# Patient Record
Sex: MALE | Race: WHITE | Employment: OTHER | URBAN - METROPOLITAN AREA
[De-identification: names, ages, dates, MRNs, and addresses within clinical notes are randomized per-mention and may not be internally consistent; named-entity substitution may affect disease eponyms.]

---

## 2022-02-15 RX ORDER — ATORVASTATIN CALCIUM 20 MG/1
20 TABLET, FILM COATED ORAL
COMMUNITY

## 2022-02-15 RX ORDER — AMLODIPINE AND BENAZEPRIL HYDROCHLORIDE 5; 10 MG/1; MG/1
1 CAPSULE ORAL DAILY
COMMUNITY

## 2022-02-15 RX ORDER — FINASTERIDE 5 MG/1
5 TABLET, FILM COATED ORAL DAILY
COMMUNITY

## 2022-02-15 RX ORDER — PANTOPRAZOLE SODIUM 40 MG/1
40 TABLET, DELAYED RELEASE ORAL
COMMUNITY

## 2022-02-15 NOTE — PERIOP NOTES
[address], [address] [facility]   MAIN OR                                  [phone]   MAIN PRE OP                          [phone]                                                                                AMBULATORY PRE OP          [phone]  PRE-ADMISSION TESTING    [phone]   Surgery Date: Wednesday, 2/16/2022       Is surgery arrival time given by surgeon? NO  If NO, [facility] staff will call you between 3 and 7pm the day before your surgery with your arrival time. (If your surgery is on a Monday, we will call you the Friday before.)    Call [phone] after 7pm Monday-Friday if you did not receive this call. INSTRUCTIONS BEFORE YOUR SURGERY   When You  Arrive Arrive at the [address] [facility] on the day of your surgery  Have your insurance card, photo ID, and any copayment (if needed)   Food   and   Drink NO food or drink after midnight the night before surgery    This means NO water, gum, mints, coffee, juice, etc.  No alcohol (beer, wine, liquor) 24 hours before and after surgery   Medications to   TAKE   Morning of Surgery MEDICATIONS TO TAKE THE MORNING OF SURGERY WITH A SIP OF WATER:    Finasteride   Medications  To  STOP      7 days before surgery  Non-Steroidal anti-inflammatory Drugs (NSAID's): for example, Ibuprofen (Advil, Motrin), Naproxen (Aleve)   Aspirin, if taking for pain    Herbal supplements, vitamins, and fish oil   Other:  (Pain medications not listed above, including Tylenol may be taken)   Blood  Thinners  If you take  Aspirin, Plavix, Coumadin, or any blood-thinning or anti-blood clot medicine, talk to the doctor who prescribed the medications for pre-operative instructions.    Bathing Clothing  Jewelry  Valuables      If you shower the morning of surgery, please do not apply anything to your skin (lotions, powders, deodorant, or makeup, especially mascara)   Do not shave or trim anywhere 24 hours before surgery   Wear your hair loose or down; no pony-tails, buns, or metal hair clips   Wear loose, comfortable, clean clothes   Wear glasses instead of contacts  [address], valuables, and jewelry, including body piercings, at home   If you were given an [employer], bring it on day of surgery. Going Home - or Spending the Night  SAME-DAY SURGERY: You must have a responsible adult drive you home and stay with you 24 hours after surgery   ADMITS: If your doctor is keeping you in the hospital after surgery, leave personal belongings/luggage in your car until you have a hospital room number. Hospital discharge time is 12 noon  Drivers must be here before 12 noon unless you are told differently   Special Instructions   What to watch for:   Coronavirus (COVID-19) affects different people in different ways   It also appears with a wide range of symptoms from mild to severe   Signs usually appear 2-14 days after exposure     If you develop any of the following, notify your doctor immediately:  o Fever  o Chills, with or without a shiver  o Muscle pain  o Headache  o Sore throat  o Dry cough  o New loss of taste or smell  o Tiredness      If you develop any of the following, call 911:  o Shortness of breath  o Difficulty breathing  o Chest pain  o New confusion  o Blueness of fingers and/or lips         Follow all instructions so your surgery wont be cancelled. Please, be on time. If a situation occurs and you are delayed the day of surgery, call [phone]. If your physical condition changes (like a fever, cold, flu, etc.) call your surgeon. Home medication(s) reviewed and verified via PHONE during PAT appointment. The patient was contacted by  PHONE    The patient verbalizes understanding of all instructions and DOES NOT   need reinforcement.

## 2022-02-16 ENCOUNTER — ANESTHESIA (OUTPATIENT)
Dept: SURGERY | Age: 87
End: 2022-02-16
Payer: MEDICARE

## 2022-02-16 ENCOUNTER — ANESTHESIA EVENT (OUTPATIENT)
Dept: SURGERY | Age: 87
End: 2022-02-16
Payer: MEDICARE

## 2022-02-16 ENCOUNTER — HOSPITAL ENCOUNTER (OUTPATIENT)
Age: 87
Setting detail: OUTPATIENT SURGERY
Discharge: HOME | End: 2022-02-16
Attending: OTOLARYNGOLOGY | Admitting: OTOLARYNGOLOGY
Payer: MEDICARE

## 2022-02-16 VITALS
TEMPERATURE: 97.9 F | WEIGHT: 132.94 LBS | HEART RATE: 98 BPM | BODY MASS INDEX: 19.69 KG/M2 | SYSTOLIC BLOOD PRESSURE: 123 MMHG | DIASTOLIC BLOOD PRESSURE: 57 MMHG | HEIGHT: 69 IN | OXYGEN SATURATION: 92 % | RESPIRATION RATE: 18 BRPM

## 2022-02-16 DIAGNOSIS — Z98.890 MOHS DEFECT OF LEFT CHEEK: Primary | ICD-10-CM

## 2022-02-16 DIAGNOSIS — M95.2 MOHS DEFECT OF LEFT CHEEK: Primary | ICD-10-CM

## 2022-02-16 LAB
COVID-19 RAPID TEST, COVR: NOT DETECTED
SOURCE, COVRS: NORMAL

## 2022-02-16 PROCEDURE — 77030002974 HC SUT PLN J&J -A: Performed by: OTOLARYNGOLOGY

## 2022-02-16 PROCEDURE — 76010000131 HC OR TIME 2 TO 2.5 HR: Performed by: OTOLARYNGOLOGY

## 2022-02-16 PROCEDURE — 74011000272 HC RX REV CODE- 272: Performed by: OTOLARYNGOLOGY

## 2022-02-16 PROCEDURE — 74011000250 HC RX REV CODE- 250: Performed by: OTOLARYNGOLOGY

## 2022-02-16 PROCEDURE — 74011250636 HC RX REV CODE- 250/636: Performed by: ANESTHESIOLOGY

## 2022-02-16 PROCEDURE — 77030008771 HC TU NG SALEM SUMP -A: Performed by: ANESTHESIOLOGY

## 2022-02-16 PROCEDURE — 77030002916 HC SUT ETHLN J&J -A: Performed by: OTOLARYNGOLOGY

## 2022-02-16 PROCEDURE — 77030013079 HC BLNKT BAIR HGGR 3M -A: Performed by: ANESTHESIOLOGY

## 2022-02-16 PROCEDURE — 77030031139 HC SUT VCRL2 J&J -A: Performed by: OTOLARYNGOLOGY

## 2022-02-16 PROCEDURE — 74011000250 HC RX REV CODE- 250: Performed by: NURSE ANESTHETIST, CERTIFIED REGISTERED

## 2022-02-16 PROCEDURE — 74011250636 HC RX REV CODE- 250/636: Performed by: OTOLARYNGOLOGY

## 2022-02-16 PROCEDURE — 77030040506 HC DRN WND MDII -A: Performed by: OTOLARYNGOLOGY

## 2022-02-16 PROCEDURE — 77030002986 HC SUT PROL J&J -A: Performed by: OTOLARYNGOLOGY

## 2022-02-16 PROCEDURE — 74011250636 HC RX REV CODE- 250/636: Performed by: NURSE ANESTHETIST, CERTIFIED REGISTERED

## 2022-02-16 PROCEDURE — 77030026438 HC STYL ET INTUB CARD -A: Performed by: ANESTHESIOLOGY

## 2022-02-16 PROCEDURE — 2709999900 HC NON-CHARGEABLE SUPPLY: Performed by: OTOLARYNGOLOGY

## 2022-02-16 PROCEDURE — 76060000035 HC ANESTHESIA 2 TO 2.5 HR: Performed by: OTOLARYNGOLOGY

## 2022-02-16 PROCEDURE — 77030008684 HC TU ET CUF COVD -B: Performed by: ANESTHESIOLOGY

## 2022-02-16 PROCEDURE — 76210000016 HC OR PH I REC 1 TO 1.5 HR: Performed by: OTOLARYNGOLOGY

## 2022-02-16 PROCEDURE — 87635 SARS-COV-2 COVID-19 AMP PRB: CPT

## 2022-02-16 PROCEDURE — 77030040356 HC CORD BPLR FRCP COVD -A: Performed by: OTOLARYNGOLOGY

## 2022-02-16 RX ORDER — SODIUM CHLORIDE, SODIUM LACTATE, POTASSIUM CHLORIDE, CALCIUM CHLORIDE 600; 310; 30; 20 MG/100ML; MG/100ML; MG/100ML; MG/100ML
75 INJECTION, SOLUTION INTRAVENOUS CONTINUOUS
Status: DISCONTINUED | OUTPATIENT
Start: 2022-02-16 | End: 2022-02-17 | Stop reason: HOSPADM

## 2022-02-16 RX ORDER — HYDROCODONE BITARTRATE AND ACETAMINOPHEN 5; 325 MG/1; MG/1
1 TABLET ORAL
Qty: 10 TABLET | Refills: 0 | Status: SHIPPED | OUTPATIENT
Start: 2022-02-16 | End: 2022-02-19

## 2022-02-16 RX ORDER — EPHEDRINE SULFATE/0.9% NACL/PF 50 MG/5 ML
SYRINGE (ML) INTRAVENOUS AS NEEDED
Status: DISCONTINUED | OUTPATIENT
Start: 2022-02-16 | End: 2022-02-16 | Stop reason: HOSPADM

## 2022-02-16 RX ORDER — LIDOCAINE HYDROCHLORIDE 20 MG/ML
INJECTION, SOLUTION EPIDURAL; INFILTRATION; INTRACAUDAL; PERINEURAL AS NEEDED
Status: DISCONTINUED | OUTPATIENT
Start: 2022-02-16 | End: 2022-02-16 | Stop reason: HOSPADM

## 2022-02-16 RX ORDER — FAMOTIDINE 10 MG/ML
INJECTION INTRAVENOUS AS NEEDED
Status: DISCONTINUED | OUTPATIENT
Start: 2022-02-16 | End: 2022-02-16 | Stop reason: HOSPADM

## 2022-02-16 RX ORDER — ONDANSETRON 2 MG/ML
4 INJECTION INTRAMUSCULAR; INTRAVENOUS AS NEEDED
Status: DISCONTINUED | OUTPATIENT
Start: 2022-02-16 | End: 2022-02-17 | Stop reason: HOSPADM

## 2022-02-16 RX ORDER — ALBUTEROL SULFATE 0.83 MG/ML
2.5 SOLUTION RESPIRATORY (INHALATION) AS NEEDED
Status: DISCONTINUED | OUTPATIENT
Start: 2022-02-16 | End: 2022-02-17 | Stop reason: HOSPADM

## 2022-02-16 RX ORDER — BACITRACIN ZINC 500 UNIT/G
OINTMENT (GRAM) TOPICAL AS NEEDED
Status: DISCONTINUED | OUTPATIENT
Start: 2022-02-16 | End: 2022-02-16 | Stop reason: HOSPADM

## 2022-02-16 RX ORDER — PROPOFOL 10 MG/ML
INJECTION, EMULSION INTRAVENOUS
Status: DISCONTINUED | OUTPATIENT
Start: 2022-02-16 | End: 2022-02-16 | Stop reason: HOSPADM

## 2022-02-16 RX ORDER — SODIUM CHLORIDE, SODIUM LACTATE, POTASSIUM CHLORIDE, CALCIUM CHLORIDE 600; 310; 30; 20 MG/100ML; MG/100ML; MG/100ML; MG/100ML
125 INJECTION, SOLUTION INTRAVENOUS CONTINUOUS
Status: DISCONTINUED | OUTPATIENT
Start: 2022-02-16 | End: 2022-02-17 | Stop reason: HOSPADM

## 2022-02-16 RX ORDER — LIDOCAINE HYDROCHLORIDE AND EPINEPHRINE 10; 10 MG/ML; UG/ML
INJECTION, SOLUTION INFILTRATION; PERINEURAL AS NEEDED
Status: DISCONTINUED | OUTPATIENT
Start: 2022-02-16 | End: 2022-02-16 | Stop reason: HOSPADM

## 2022-02-16 RX ORDER — CEFDINIR 300 MG/1
600 CAPSULE ORAL DAILY
Qty: 14 CAPSULE | Refills: 0 | Status: SHIPPED | OUTPATIENT
Start: 2022-02-16 | End: 2022-02-23

## 2022-02-16 RX ORDER — DEXAMETHASONE SODIUM PHOSPHATE 4 MG/ML
INJECTION, SOLUTION INTRA-ARTICULAR; INTRALESIONAL; INTRAMUSCULAR; INTRAVENOUS; SOFT TISSUE AS NEEDED
Status: DISCONTINUED | OUTPATIENT
Start: 2022-02-16 | End: 2022-02-16 | Stop reason: HOSPADM

## 2022-02-16 RX ORDER — FENTANYL CITRATE 50 UG/ML
INJECTION, SOLUTION INTRAMUSCULAR; INTRAVENOUS AS NEEDED
Status: DISCONTINUED | OUTPATIENT
Start: 2022-02-16 | End: 2022-02-16 | Stop reason: HOSPADM

## 2022-02-16 RX ORDER — ONDANSETRON 4 MG/1
4 TABLET, ORALLY DISINTEGRATING ORAL
Qty: 20 TABLET | Refills: 2 | Status: SHIPPED | OUTPATIENT
Start: 2022-02-16

## 2022-02-16 RX ORDER — SUCCINYLCHOLINE CHLORIDE 20 MG/ML
INJECTION INTRAMUSCULAR; INTRAVENOUS AS NEEDED
Status: DISCONTINUED | OUTPATIENT
Start: 2022-02-16 | End: 2022-02-16 | Stop reason: HOSPADM

## 2022-02-16 RX ORDER — DEXAMETHASONE SODIUM PHOSPHATE 4 MG/ML
10 INJECTION, SOLUTION INTRA-ARTICULAR; INTRALESIONAL; INTRAMUSCULAR; INTRAVENOUS; SOFT TISSUE
Status: DISCONTINUED | OUTPATIENT
Start: 2022-02-16 | End: 2022-02-16 | Stop reason: HOSPADM

## 2022-02-16 RX ORDER — FENTANYL CITRATE 50 UG/ML
50 INJECTION, SOLUTION INTRAMUSCULAR; INTRAVENOUS
Status: DISCONTINUED | OUTPATIENT
Start: 2022-02-16 | End: 2022-02-17 | Stop reason: HOSPADM

## 2022-02-16 RX ORDER — PHENYLEPHRINE HCL IN 0.9% NACL 0.4MG/10ML
SYRINGE (ML) INTRAVENOUS AS NEEDED
Status: DISCONTINUED | OUTPATIENT
Start: 2022-02-16 | End: 2022-02-16 | Stop reason: HOSPADM

## 2022-02-16 RX ORDER — PROPOFOL 10 MG/ML
INJECTION, EMULSION INTRAVENOUS AS NEEDED
Status: DISCONTINUED | OUTPATIENT
Start: 2022-02-16 | End: 2022-02-16 | Stop reason: HOSPADM

## 2022-02-16 RX ORDER — ROCURONIUM BROMIDE 10 MG/ML
INJECTION, SOLUTION INTRAVENOUS AS NEEDED
Status: DISCONTINUED | OUTPATIENT
Start: 2022-02-16 | End: 2022-02-16 | Stop reason: HOSPADM

## 2022-02-16 RX ORDER — BACITRACIN ZINC 500 UNIT/G
OINTMENT (GRAM) TOPICAL
Qty: 15 G | Refills: 0 | Status: SHIPPED | OUTPATIENT
Start: 2022-02-16

## 2022-02-16 RX ADMIN — CEFAZOLIN SODIUM 2 G: 1 POWDER, FOR SOLUTION INTRAMUSCULAR; INTRAVENOUS at 17:39

## 2022-02-16 RX ADMIN — SODIUM CHLORIDE, POTASSIUM CHLORIDE, SODIUM LACTATE AND CALCIUM CHLORIDE: 600; 310; 30; 20 INJECTION, SOLUTION INTRAVENOUS at 16:55

## 2022-02-16 RX ADMIN — PROPOFOL 50 MG: 10 INJECTION, EMULSION INTRAVENOUS at 18:04

## 2022-02-16 RX ADMIN — FENTANYL CITRATE 50 MCG: 50 INJECTION, SOLUTION INTRAMUSCULAR; INTRAVENOUS at 20:21

## 2022-02-16 RX ADMIN — Medication 80 MCG: at 18:20

## 2022-02-16 RX ADMIN — LIDOCAINE HYDROCHLORIDE 40 MG: 20 INJECTION, SOLUTION EPIDURAL; INFILTRATION; INTRACAUDAL; PERINEURAL at 17:29

## 2022-02-16 RX ADMIN — FENTANYL CITRATE 50 MCG: 50 INJECTION INTRAMUSCULAR; INTRAVENOUS at 17:17

## 2022-02-16 RX ADMIN — FENTANYL CITRATE 25 MCG: 50 INJECTION INTRAMUSCULAR; INTRAVENOUS at 18:09

## 2022-02-16 RX ADMIN — ROCURONIUM BROMIDE 10 MG: 10 INJECTION INTRAVENOUS at 17:29

## 2022-02-16 RX ADMIN — SUCCINYLCHOLINE CHLORIDE 80 MG: 20 INJECTION, SOLUTION INTRAMUSCULAR; INTRAVENOUS at 17:30

## 2022-02-16 RX ADMIN — SODIUM CHLORIDE, POTASSIUM CHLORIDE, SODIUM LACTATE AND CALCIUM CHLORIDE: 600; 310; 30; 20 INJECTION, SOLUTION INTRAVENOUS at 18:05

## 2022-02-16 RX ADMIN — Medication 80 MCG: at 18:28

## 2022-02-16 RX ADMIN — FAMOTIDINE 20 MG: 10 INJECTION INTRAVENOUS at 17:19

## 2022-02-16 RX ADMIN — DEXAMETHASONE SODIUM PHOSPHATE 10 MG: 4 INJECTION, SOLUTION INTRAMUSCULAR; INTRAVENOUS at 17:35

## 2022-02-16 RX ADMIN — Medication 10 MG: at 17:44

## 2022-02-16 RX ADMIN — PROPOFOL 150 MCG/KG/MIN: 10 INJECTION, EMULSION INTRAVENOUS at 17:36

## 2022-02-16 RX ADMIN — Medication 10 MG: at 17:39

## 2022-02-16 RX ADMIN — PROPOFOL 100 MCG/KG/MIN: 10 INJECTION, EMULSION INTRAVENOUS at 17:37

## 2022-02-16 RX ADMIN — ROCURONIUM BROMIDE 10 MG: 10 INJECTION INTRAVENOUS at 17:41

## 2022-02-16 RX ADMIN — FENTANYL CITRATE 50 MCG: 50 INJECTION INTRAMUSCULAR; INTRAVENOUS at 17:27

## 2022-02-16 RX ADMIN — PROPOFOL 100 MG: 10 INJECTION, EMULSION INTRAVENOUS at 17:29

## 2022-02-16 NOTE — ANESTHESIA PREPROCEDURE EVALUATION
Relevant Problems   No relevant active problems       Anesthetic History   No history of anesthetic complications            Review of Systems / Medical History  Patient summary reviewed, nursing notes reviewed and pertinent labs reviewed    Pulmonary          Smoker         Neuro/Psych   Within defined limits           Cardiovascular    Hypertension              Exercise tolerance: >4 METS     GI/Hepatic/Renal     GERD    Renal disease: CRI  PUD     Endo/Other        Cancer     Other Findings   Comments: Skin cancer on face           Physical Exam    Airway  Mallampati: II    Neck ROM: normal range of motion   Mouth opening: Normal     Cardiovascular  Regular rate and rhythm,  S1 and S2 normal,  no murmur, click, rub, or gallop  Rhythm: regular  Rate: normal         Dental    Dentition: Edentulous     Pulmonary  Breath sounds clear to auscultation               Abdominal  GI exam deferred       Other Findings            Anesthetic Plan    ASA: 3  Anesthesia type: general          Induction: Intravenous  Anesthetic plan and risks discussed with: Patient

## 2022-02-16 NOTE — H&P
Otolaryngology, Head and Neck Surgery    Chief Complaint:    History of Present Illness:   Patient is a 80 y.o. male who is being seen for Mohs reconstruction. Underwent excision of a left cheek skin cancer by Dr. Joe Seals last week. He presents for closure. 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 No pertinent family history. Social History     Tobacco Use    Smoking status: Current Every Day Smoker     Packs/day: 1.00     Years: 73.00     Pack years: 73.00    Smokeless tobacco: Former User   Substance Use Topics    Alcohol use: Not Currently     Past Surgical History:   Procedure Laterality Date    HX APPENDECTOMY      HX HEMORRHOIDECTOMY  56    HX HERNIA REPAIR Right 1976   [facility]  [id number]    multiple facial surgeries for CA      Current Facility-Administered Medications   Medication Dose Route Frequency    ceFAZolin (ANCEF) 2 g in sterile water (preservative free) 20 mL IV syringe  2 g IntraVENous ON CALL TO OR    dexamethasone (DECADRON) 4 mg/mL injection 10 mg  10 mg IntraVENous ON CALL TO OR    lactated Ringers infusion  75 mL/hr IntraVENous CONTINUOUS      Allergies   Allergen Reactions    Aldara [Imiquimod] Angioedema        Review of Systems:  Pertinent items are noted in the History of Present Illness. Objective:     Patient Vitals for the past 8 hrs:   BP Temp Pulse Resp SpO2 Height Weight   22 1428 (!) 146/83 98.4 °F (36.9 °C) (!) 104 17 97 % 5' 9\" (1.753 m) 60.3 kg (132 lb 15 oz)     Temp (24hrs), Av.4 °F (36.9 °C), Min:98.4 °F (36.9 °C), Max:98.4 °F (36.9 °C)    No intake/output data recorded.     PHYSICAL EXAM:    CONSTITUTIONAL:  Well nourished individual in no acute distress. The patient is able to communicate with normal voice quality. HEAD AND NECK EXAM:    HEAD & FACE: Left cheek dressing in place, clean. EYES: Conjunctiva normal.  No abnormalities of the lids or globes. Extraocular movements intact and conjugate. EARS: No significant abnormality of the external ear. NOSE: No significant external nasal lesions. No turbinate hypertrophy or mucosal lesions. No polyps, masses or purulence seen anteriorly. No edema. No significant septal deviation. SINUSES: The paranasal sinus regions are non-tender to palpation. ORAL CAVITY/OROPHARYNX: Lips and gums are without lesions. Oral cavity and oropharynx are without mucosal lesions or abnormalities. Tonsillar fossae, palate, tongue and uvula without abnormality. No edema. No erythema. NECK: No palpable lymphadenopathy or other masses in the neck. The trachea and larynx are midline. No thyroid enlargement or nodules appreciated. No abnormality of the parotid or submandibular glands present. LYMPHATIC: No lymphadenopathy in the neck/head. CHEST:  CTA  HEART:  RRR  NEUROLOGIC/PSYCHIATRIC:    NEUROLOGIC:  Cranial nerves 3, 4, 5, 6, 7, 10 (soft palate elevation), 11 and 12 bilaterally intact and symmetric. ORIENTATION/MOOD/AFFECT: Oriented to person, place, time and general circumstances. Mood and affect appropriate. Assessment:     Left cheek Mohs defect    Plan:     Ready to proceed with left cheek reconstruction. Questions answered. Consent signed. Signed By: Renaee Lesches, MD     February 16, 2022       Gaby BHAKTA [facility], [place], [facility], [address] [place] [address] Facial Plastic Surgery  [email or web address]  [email or web address]  [address] [place] [address], [address] [place] [address] [id number] [place] [address]  Ph:  [phone]  Fax: [phone]

## 2022-02-17 NOTE — BRIEF OP NOTE
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       Procedure(s):  FACIAL MOHS RECONSTRUCTION, LOCAL FLAP, POSSIBLE SKIN GRAFT (URGENT) (GEN/ET)    Surgeon(s):  Yenifer Guillen MD    Surgical Assistant: None    Anesthesia: General     Estimated Blood Loss (mL): Minimal    Complications: None    Specimens: * No specimens in log *     Implants: * No implants in log *    Drains:   Drain 10 mm round rain 02/16/22 Left Face (Active)       Findings: 6x4.5cm left cheek skin defect    Electronically Signed by Keila Ortega MD on 2/16/2022 at 7:37 PM

## 2022-02-17 NOTE — ANESTHESIA POSTPROCEDURE EVALUATION
Procedure(s):  FACIAL MOHS RECONSTRUCTION, LOCAL FLAP, POSSIBLE SKIN GRAFT (URGENT) (GEN/ET). general    Anesthesia Post Evaluation      Multimodal analgesia: multimodal analgesia not used between 6 hours prior to anesthesia start to PACU discharge  Patient location during evaluation: PACU  Patient participation: complete - patient participated  Level of consciousness: awake and alert  Pain score: 0  Pain management: adequate  Airway patency: patent  Anesthetic complications: no  Cardiovascular status: hemodynamically stable and acceptable  Respiratory status: acceptable  Hydration status: acceptable  Comments: Patient seen and evaluated; no concerns. Post anesthesia nausea and vomiting:  none      INITIAL Post-op Vital signs:   Vitals Value Taken Time   /69 02/16/22 2035   Temp 36.6 °C (97.9 °F) 02/16/22 2030   Pulse 104 02/16/22 2037   Resp 14 02/16/22 2037   SpO2 93 % 02/16/22 2032   Vitals shown include unvalidated device data.

## 2022-02-17 NOTE — DISCHARGE INSTRUCTIONS
DISCHARGE SUMMARY from your Nurse    The following personal items collected during your admission are returned to you:   Dental Appliance: Dental Appliances: None  Vision: Visual Aid: Glasses  Hearing Aid:    Jewelry:    Clothing:    Other Valuables:    Valuables sent to safe:      PATIENT INSTRUCTIONS:    After general anesthesia or intravenous sedation, for 24 hours or while taking prescription Narcotics:  · Limit your activities  · Do not drive and operate hazardous machinery  · Do not make important personal or business decisions  · Do  not drink alcoholic beverages  · If you have not urinated within 8 hours after discharge, please contact your surgeon on call. Report the following to your surgeon:  · Excessive pain, swelling, redness or odor of or around the surgical area  · Temperature over 100.5  · Nausea and vomiting lasting longer than 4 hours or if unable to take medications  · Any signs of decreased circulation or nerve impairment to extremity: change in color, persistent  numbness, tingling, coldness or increase pain  · Any questions    COUGH AND DEEP BREATHE    Breathing deep and coughing are very important exercises to do after surgery. Deep breathing and coughing open the little air tubes and air sacks in your lungs. You take deep breaths every day. You may not even notice - it is just something you do when you sigh or yawn. It is a natural exercise you do to keep these air passages open. After surgery, take deep breaths and cough, on purpose. Coughing and deep breathing help prevent bronchitis and pneumonia after surgery. If you had chest or belly surgery, use a pillow as a \"hug buddy\" and hold it tightly to your chest or belly when you cough. DIRECTIONS:  · Take 10 to 15 slow deep breaths every hour while awake. · Breathe in deeply, and hold it for 2 seconds. · Exhale slowly through puckered lips, like blowing up a balloon.   · After every 4th or 5th deep breath, hug your pillow to your chest or belly and give a hard, deep cough. Yes, it will probably hurt. But doing this exercise is very important part of healing after surgery. Take your pain medicine to help you do this exercise without too much pain. IF YOU HAVE BEEN DIAGNOSED WITH SLEEP APNEA, PLEASE USE YOUR SLEEP APNEA DEVICE OR CPAP MACHINE WHEN YOU INTEND TO NAP AFTER TAKING PAIN MEDICATION. Ankle Pumps    Ankle pumps increase the circulation of oxygenated blood to your lower extremities and decrease your risk for circulation problems such as blood clots. They also stretch the muscles, tendons and ligaments in your foot and ankle, and prevent joint contracture in the ankle and foot, especially after surgeries on the legs. It is important to do ankle pump exercises regularly after surgery because immobility increases your risk for developing a blood clot. Your doctor may also have you take an Aspirin for the next few days as well. If your doctor did not ask you to take an Aspirin, consult with him before starting Aspirin therapy on your own. Slowly point your foot forward, feeling the muscles on the top of your lower leg stretch, and hold this position for 5 seconds. Next, pull your foot back toward you as far as possible, stretching the calf muscles, and hold that position for 5 seconds. Repeat with the other foot. Perform 10 repetitions every hour while awake for both ankles if possible (down and then up with the foot once is one repetition). You should feel gentle stretching of the muscles in your lower leg when doing this exercise. If you feel pain, or your range of motion is limited, don't  Push too hard. Only go the limit your joint and muscles will let you go. If you have increasing pain, progressively worsening leg warmth or swelling, STOP the exercise and call your doctor.      Below is information about the medications your doctor is prescribing after your visit:    Other information in your discharge envelope:  []     PRESCRIPTIONS  []     SCHOOL/WORK NOTE  []     INFECTION PREVENTION  []     REGIONAL NERVE BLOCK PAMPHLET  []     REGIONAL NERVE BLOCK ON QUE PAMPHLET   []     EXPAREL  []     HANDICAP APPLICATION         These are general instructions for a healthy lifestyle:    *  Please give a list of your current medications to your Primary Care Provider. *  Please update this list whenever your medications are discontinued, doses are      changed, or new medications (including over-the-counter products) are added. *  Please carry medication information at all times in case of emergency situations. About Smoking  No smoking / No tobacco products / Avoid exposure to second hand smoke    Surgeon General's Warning:  Quitting smoking now greatly reduces serious risk to your health. Obesity, smoking, and sedentary lifestyle greatly increases your risk for illness and disease. A healthy diet, regular physical exercise & weight monitoring are important for maintaining a healthy lifestyle. Congestive Heart Failure  You may be retaining fluid if you have a history of heart failure or if you experience any of the following symptoms:  Weight gain of 3 pounds or more overnight or 5 pounds in a week, increased swelling in our hands or feet or shortness of breath while lying flat in bed. Please call your doctor as soon as you notice any of these symptoms; do not wait until your next office visit. Recognize signs and symptoms of STROKE:  F - face looks uneven  A - arms unable to move or move even  S - speech slurred or non-existent  T - time-call 911 as soon as signs and symptoms begin-DO NOT go         Back to bed or wait to see if you get better-TIME IS BRAIN. Warning signs of HEART ATTACK  Call 911 if you have these symptoms    · Chest discomfort.   Most heart attacks involve discomfort in the center of the chest that lasts more than a few minutes, or that goes away and comes back. It can feel like uncomfortable pressure, squeezing, fullness, or pain. · Discomfort in other areas of the upper body. Symptoms can include pain or discomfort in one or both        Arms, the back, neck, jaw, or stomach. ·  Shortness of breath with or without chest discomfort. · Other signs may include breaking out in a cold sweat, nausea, or lightheadedness    Don't wait more than five minutes to call 911 - MINUTES MATTER! Fast action can save your life. Calling 911 is almost always the fastest way to get lifesaving treatment. Emergency Medical Services staff can begin treatment when they arrive - up to an hour sooner than if someone gets to the hospital by car. [facility] MEDICATION AND SIDE EFFECT GUIDE    The [facility] MEDICATION AND SIDE EFFECT GUIDE was provided to the PATIENT AND CARE PROVIDER. Information provided includes instruction about drug purpose and common side effects for the following medications:    ·     Patient Discharge Instructions    Sandra Vance / [id number] : 1933    Admitted 2022 Discharged: 2022            Facial Reconstruction Surgery Post-Operative Instructions      Have someone help you at home for 1-2 days.  Get plenty of rest.   Follow a balanced diet.  Take pain medication as prescribed.  Do not take aspirin (or products that contain aspirin) unless approved by your surgeon.  Do not drink alcohol while taking pain medication.  DO NOT SMOKE. Smoking decreases blood flow. It can delay wound healing or cause tissue loss. Activities   Start walking as soon as possible, this helps to reduce swelling and lowers the chance of blood clots.  Do not drive until you are no longer taking pain medication (narcotics).  You may tire easily. Plan on taking it easy for the first week.  No strenuous activity, including sex and heavy housework for 2 weeks post op.  Avoid bending over.     Keep your head elevated. Incision Care    Primary Closure   Applying antibiotic ointment 3-4x/day.  You may shower after 48 hours. Keep the incision lines coated with ointment while in the shower and avoid letting water spray directly on the incision.  Crust/dried blood around stitches can be gently removed using a q-tip moistened with hydrogen peroxide. Keeping the sutures coated with ointment can help prevent crust formation. What To Expect   Maximum discomfort should occur in the first few days, improving each day thereafter.  Bruising, swelling, numbness, tightness and tenderness of skin for 10 - 14 days.  The face may look and feel strange and be distorted from the swelling.  Sensation may be decreased. This may or may not fully return. When to Call   If you have increased swelling or bruising.  If you have increased redness along the incision.  If you have increased pain that is not relieved by your pain medication.  If you have any side effect from your medication; rash, nausea, vomiting, diarrhea, etc.    If you have a temperature greater than 101.0F    If you have yellow or green drainage from the incisions or notice a foul odor.  If you have bleeding from the incisions that is difficult to control with light pressure. Follow-up with Peter Henriquez MD Thursday 2/17/22 12:45pm    If you have any questions, please call us at [phone]. We are always happy to answer your questions, and if you should have a problem, this number is answered 24 hours a day.

## 2022-02-17 NOTE — OP NOTES
[facility] [facility]  OPERATIVE REPORT    Name:  Wendy Murphy  MR#:  [id number]  :  1933  ACCOUNT #:  [de-identified]  DATE OF SERVICE:  2022    PREOPERATIVE DIAGNOSIS:  Left cheek Mohs defect. POSTOPERATIVE DIAGNOSIS:  Left cheek Mohs defect. PROCEDURES PERFORMED:  1. Local flap closure, left cheek Mohs defect. 2.  Complex closure of left cheek Mohs defect. SURGEON:  Keila Ortega MD    ASSISTANT:  none    ANESTHESIA:  General.    COMPLICATIONS:  None. SPECIMENS REMOVED:  None. IMPLANTS:  none. ESTIMATED BLOOD LOSS:  Minimal.    FINDINGS:  There was a 6 cm vertical x 4.5-cm horizontal defect in the left cheek which extended from the preauricular hair tuft in the temple down to the preauricular region involving some of the tragal skin and then inferiorly even with the attachment of the lobule. There were numerous scars in the face from previous surgery including right at the inferior edge of the defect and then also in the anterior cheek and in the postauricular region. The defect involved only the skin. There is no evidence of violation of the parotid capsule or the deeper tissues. INDICATIONS FOR PROCEDURE:  The patient is an 24-year-old male who underwent excision of a nonmelanoma skin cancer from his left cheek by Dr. Hi Guadalupe a few days prior to this procedure. He presents for reconstruction. PROCEDURE:  After informed consent, the patient was taken to the operating room and placed on the table in a supine position. After general anesthesia, the table was turned 180 degrees. The dressing on the face was removed and the defect was examined. It was measured and then marked with the planned incision extending from the posterior edge of the defect into the prelobular crease and then around the lobule, and then extending with the general curve back into the neck, and then curving back to a natural neck line about retirement down the neck.   The area was infiltrated with 1% lidocaine with 1:100,000 epinephrine. The patient was prepped and draped in standard fashion. Initially, the incision was made with the 15-blade. The skin was elevated in the deep subcutaneous plane leaving as much fat on the flap as possible. This was done extensively down into the neck and then anteriorly into the cheek superiorly as well. Dissection was performed posterior into the temple skin and then anteriorly into the lateral forehead. The most superior aspect of the defect was then closed after this extensive elevation of tissue, taking out a small dog ear with the scissors. This reduced the overall height of the defect to prevent so much need for advancement of the flap. The flap was then advanced up superiorly and tacked in with 4-0 Vicryl sutures for deep layer. The dog ear medially was excised with scissors and then closed with the same sutures. The neck was closed as well and this required triangular excision of skin into the more lateral neck into the natural neck crease to close the dog ear in this area. A small drain was placed into the inferior portion into the neck and curved up under the cheek flap. Further 4-0 Vicryl sutures were placed in the deep layer and the skin was closed with interrupted 5-0 nylon sutures in interrupted fashion and then 5-0 plain running locking sutures for the final layer. Note that the flap was trimmed a bit to fit the contour of the defect along the region of the tragus for about a centimeter. The drain was held in suction at this point. Note also that the area was irrigated prior to closure and bipolar cautery was used for oozing along the way. There was minimal bleeding. The flap had excellent capillary refill at the end. A thin layer of Nitropaste was placed prophylactically and then bacitracin was placed on the incisions after cleaning.   The patient was then awakened from anesthesia, extubated and taken to the PACU in stable condition. There were no complications. The patient tolerated the procedure well. Note that the total area of this flap was approximately about 50 cm2 including the flap that was required postauricularly to close the donor defect and the link of the complex closure in the temporal region was about 3.5 cm.       MD NUNO [email or web address]  D:  02/16/2022 19:57  T:  02/16/2022 23:14  JOB #:  [id number]

## 2023-05-23 RX ORDER — FINASTERIDE 5 MG/1
5 TABLET, FILM COATED ORAL DAILY
COMMUNITY

## 2023-05-23 RX ORDER — AMLODIPINE BESYLATE AND BENAZEPRIL HYDROCHLORIDE 5; 10 MG/1; MG/1
1 CAPSULE ORAL DAILY
COMMUNITY

## 2023-05-23 RX ORDER — PANTOPRAZOLE SODIUM 40 MG/1
40 TABLET, DELAYED RELEASE ORAL
COMMUNITY

## 2023-05-23 RX ORDER — ONDANSETRON 4 MG/1
4 TABLET, ORALLY DISINTEGRATING ORAL EVERY 4 HOURS PRN
COMMUNITY
Start: 2022-02-16

## 2023-05-23 RX ORDER — ATORVASTATIN CALCIUM 20 MG/1
20 TABLET, FILM COATED ORAL NIGHTLY
COMMUNITY

## 2023-05-23 RX ORDER — BACITRACIN ZINC 500 [USP'U]/G
OINTMENT TOPICAL
COMMUNITY
Start: 2022-02-16

## (undated) DEVICE — DRESSING,GAUZE,XEROFORM,CURAD,5"X9",ST: Brand: CURAD

## (undated) DEVICE — GLOVE ORANGE PI 7 1/2   MSG9075

## (undated) DEVICE — ADHESIVE ISLAND DRESSING: Brand: TELFA

## (undated) DEVICE — SOLUTION SCRB 2OZ 10% POVIDONE IOD ANTIMIC BTL

## (undated) DEVICE — SUTURE ABSORBABLE BRAIDED 4-0 P-3 18 IN UD VICRYL J494G

## (undated) DEVICE — BANDAGE COBAN 4 IN COMPR W4INXL5YD FOAM COHESIVE QUIK STK SELF ADH SFT

## (undated) DEVICE — SUTURE PROL SZ 3-0 L18IN NONABSORBABLE BLU L19MM FS-2 3/8 8665G

## (undated) DEVICE — BIPOLAR FORCEPS CORD: Brand: VALLEYLAB

## (undated) DEVICE — BANDAGE,GAUZE,BULKEE II,4.5"X4.1YD,STRL: Brand: MEDLINE

## (undated) DEVICE — SUT PLN 5-0 18IN P3 YEL --

## (undated) DEVICE — BLADE, TONGUE, 6", STERILE: Brand: MEDLINE

## (undated) DEVICE — MINOR ENT-SFMCASU: Brand: MEDLINE INDUSTRIES, INC.

## (undated) DEVICE — TUBING SUCT 10FR MAL ALUM SHFT FN CAP VENT UNIV CONN W/ OBT

## (undated) DEVICE — SPONGE GZ W4XL4IN COT 12 PLY TYP VII WVN C FLD DSGN

## (undated) DEVICE — ADHESIVE LIQ 2OZ ADJUNCT FOR DSG MASTISOL

## (undated) DEVICE — DRAIN RND CHN 10FR 1/8 IN FULL FLUT

## (undated) DEVICE — SPONGE GZ W4XL4IN COT RADPQ HIGHLY ABSRB

## (undated) DEVICE — SUT ETHLN 5-0 18IN P3 BLK --

## (undated) DEVICE — SOLUTION IRRIG 1000ML 0.9% SOD CHL USP POUR PLAS BTL

## (undated) DEVICE — GLOVE ORANGE PI 7   MSG9070

## (undated) DEVICE — STRIP,CLOSURE,WOUND,MEDI-STRIP,1/2X4: Brand: MEDLINE

## (undated) DEVICE — RESERVOIR,SUCTION,100CC,SILICONE: Brand: MEDLINE

## (undated) DEVICE — PAD,NON-ADHERENT,3X8,STERILE,LF,1/PK: Brand: MEDLINE

## (undated) DEVICE — TAPE ADH W0.5INXL10YD WHT PAPR GENTLE BRTH FLX COMFORTABLE